# Patient Record
Sex: FEMALE | Race: WHITE | NOT HISPANIC OR LATINO | URBAN - METROPOLITAN AREA
[De-identification: names, ages, dates, MRNs, and addresses within clinical notes are randomized per-mention and may not be internally consistent; named-entity substitution may affect disease eponyms.]

---

## 2018-12-05 ENCOUNTER — OUTPATIENT (OUTPATIENT)
Dept: OUTPATIENT SERVICES | Facility: HOSPITAL | Age: 38
LOS: 1 days | Discharge: HOME | End: 2018-12-05

## 2018-12-06 DIAGNOSIS — E04.9 NONTOXIC GOITER, UNSPECIFIED: ICD-10-CM

## 2019-08-31 ENCOUNTER — EMERGENCY (EMERGENCY)
Facility: HOSPITAL | Age: 39
LOS: 0 days | Discharge: HOME | End: 2019-08-31
Attending: EMERGENCY MEDICINE | Admitting: EMERGENCY MEDICINE
Payer: COMMERCIAL

## 2019-08-31 VITALS
TEMPERATURE: 97 F | OXYGEN SATURATION: 99 % | HEART RATE: 74 BPM | WEIGHT: 179.9 LBS | RESPIRATION RATE: 18 BRPM | SYSTOLIC BLOOD PRESSURE: 114 MMHG | DIASTOLIC BLOOD PRESSURE: 70 MMHG | HEIGHT: 69 IN

## 2019-08-31 DIAGNOSIS — S90.01XA CONTUSION OF RIGHT ANKLE, INITIAL ENCOUNTER: ICD-10-CM
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
## 2019-08-31 PROCEDURE — 99283 EMERGENCY DEPT VISIT LOW MDM: CPT | Mod: 25

## 2019-08-31 PROCEDURE — 73590 X-RAY EXAM OF LOWER LEG: CPT | Mod: 26,RT

## 2019-08-31 PROCEDURE — 29515 APPLICATION SHORT LEG SPLINT: CPT

## 2019-08-31 PROCEDURE — 73610 X-RAY EXAM OF ANKLE: CPT | Mod: 26,RT

## 2019-08-31 RX ORDER — IBUPROFEN 200 MG
800 TABLET ORAL ONCE
Refills: 0 | Status: COMPLETED | OUTPATIENT
Start: 2019-08-31 | End: 2019-08-31

## 2019-08-31 RX ORDER — LEVOTHYROXINE SODIUM 125 MCG
1 TABLET ORAL
Qty: 0 | Refills: 0 | DISCHARGE

## 2019-08-31 RX ADMIN — Medication 800 MILLIGRAM(S): at 17:55

## 2019-08-31 NOTE — ED PROVIDER NOTE - PATIENT PORTAL LINK FT
You can access the FollowMyHealth Patient Portal offered by [facility] by registering at the following website: [email or web address]. By joining [employer]’s FollowMyHealth portal, you will also be able to view your health information using other applications (apps) compatible with our system.

## 2019-08-31 NOTE — ED PROVIDER NOTE - PHYSICAL EXAMINATION
msk: right ankle +tenderness to lateral aspect. no laxity or achilles tenderness. no 5th metatarsal tenderness. +tender contusion to right lateral lower leg    skin: hematoma to right lateral ankle

## 2019-08-31 NOTE — ED PROVIDER NOTE - OBJECTIVE STATEMENT
38 y/o female presents s/p twisting injury to right ankle [place] 4 pm [place] the park. patient unable to ambulate secondary to pain. patient with bruising to right lateral ankle. patient c/o throbbing pain. no other injuries. symptoms worse with movement and weight bearing

## 2019-08-31 NOTE — ED ADULT NURSE NOTE - NSIMPLEMENTINTERV_GEN_ALL_ED
Implemented All Universal Safety Interventions:  [place] to call system. Call bell, personal items and telephone within reach. Instruct patient to call for assistance. Room bathroom lighting operational. Non-slip footwear when patient is off stretcher. Physically safe environment: no spills, clutter or unnecessary equipment. Stretcher in lowest position, wheels locked, appropriate side rails in place.

## 2019-08-31 NOTE — ED PROVIDER NOTE - PROGRESS NOTE DETAILS
38 yo F presents c/o of right ankle pain. Pt states she twisted her ankle while walking at the park. Pt with painful ambulation. On exam + mild swelling and ecchymosis to the right lateral ankle, FROM with pain, achilles intact, no laxity, pulses intact. Plan - XR, splint, f/u with ortho.

## 2019-08-31 NOTE — ED PROVIDER NOTE - CARE PROVIDER_API CALL
Kingston Luo (MD)  Orthopaedic Surgery  [address] [place] [address]  Phone: [phone]  Fax: [phone]  Follow Up Time: 4-6 Days

## 2020-03-13 ENCOUNTER — EMERGENCY (EMERGENCY)
Facility: HOSPITAL | Age: 40
LOS: 0 days | Discharge: HOME | End: 2020-03-13
Attending: STUDENT IN AN ORGANIZED HEALTH CARE EDUCATION/TRAINING PROGRAM | Admitting: STUDENT IN AN ORGANIZED HEALTH CARE EDUCATION/TRAINING PROGRAM
Payer: COMMERCIAL

## 2020-03-13 VITALS
OXYGEN SATURATION: 100 % | RESPIRATION RATE: 20 BRPM | TEMPERATURE: 98 F | HEART RATE: 98 BPM | SYSTOLIC BLOOD PRESSURE: 145 MMHG | DIASTOLIC BLOOD PRESSURE: 80 MMHG

## 2020-03-13 DIAGNOSIS — R07.9 CHEST PAIN, UNSPECIFIED: ICD-10-CM

## 2020-03-13 DIAGNOSIS — R10.13 EPIGASTRIC PAIN: ICD-10-CM

## 2020-03-13 DIAGNOSIS — M54.9 DORSALGIA, UNSPECIFIED: ICD-10-CM

## 2020-03-13 PROBLEM — Z78.9 OTHER SPECIFIED HEALTH STATUS: Chronic | Status: ACTIVE | Noted: 2019-08-31

## 2020-03-13 LAB
ALBUMIN SERPL ELPH-MCNC: 5 G/DL — SIGNIFICANT CHANGE UP (ref 3.5–5.2)
ALP SERPL-CCNC: 48 U/L — SIGNIFICANT CHANGE UP (ref 30–115)
ALT FLD-CCNC: 19 U/L — SIGNIFICANT CHANGE UP (ref 0–41)
ANION GAP SERPL CALC-SCNC: 11 MMOL/L — SIGNIFICANT CHANGE UP (ref 7–14)
AST SERPL-CCNC: 21 U/L — SIGNIFICANT CHANGE UP (ref 0–41)
BILIRUB SERPL-MCNC: 0.5 MG/DL — SIGNIFICANT CHANGE UP (ref 0.2–1.2)
BUN SERPL-MCNC: 10 MG/DL — SIGNIFICANT CHANGE UP (ref 10–20)
CALCIUM SERPL-MCNC: 9.5 MG/DL — SIGNIFICANT CHANGE UP (ref 8.5–10.1)
CHLORIDE SERPL-SCNC: 105 MMOL/L — SIGNIFICANT CHANGE UP (ref 98–110)
CO2 SERPL-SCNC: 26 MMOL/L — SIGNIFICANT CHANGE UP (ref 17–32)
CREAT SERPL-MCNC: 0.7 MG/DL — SIGNIFICANT CHANGE UP (ref 0.7–1.5)
GLUCOSE SERPL-MCNC: 89 MG/DL — SIGNIFICANT CHANGE UP (ref 70–99)
HCT VFR BLD CALC: 34.1 % — LOW (ref 37–47)
HGB BLD-MCNC: 10.5 G/DL — LOW (ref 12–16)
LIDOCAIN IGE QN: 23 U/L — SIGNIFICANT CHANGE UP (ref 7–60)
MCHC RBC-ENTMCNC: 18 PG — LOW (ref 27–31)
MCHC RBC-ENTMCNC: 30.8 G/DL — LOW (ref 32–37)
MCV RBC AUTO: 58.4 FL — LOW (ref 81–99)
NRBC # BLD: 0 /100 WBCS — SIGNIFICANT CHANGE UP (ref 0–0)
PLATELET # BLD AUTO: 251 K/UL — SIGNIFICANT CHANGE UP (ref 130–400)
POTASSIUM SERPL-MCNC: 4.4 MMOL/L — SIGNIFICANT CHANGE UP (ref 3.5–5)
POTASSIUM SERPL-SCNC: 4.4 MMOL/L — SIGNIFICANT CHANGE UP (ref 3.5–5)
PROT SERPL-MCNC: 8.2 G/DL — HIGH (ref 6–8)
RBC # BLD: 5.84 M/UL — HIGH (ref 4.2–5.4)
RBC # FLD: 20.3 % — HIGH (ref 11.5–14.5)
SODIUM SERPL-SCNC: 142 MMOL/L — SIGNIFICANT CHANGE UP (ref 135–146)
TROPONIN T SERPL-MCNC: <0.01 NG/ML — SIGNIFICANT CHANGE UP
WBC # BLD: 7.59 K/UL — SIGNIFICANT CHANGE UP (ref 4.8–10.8)
WBC # FLD AUTO: 7.59 K/UL — SIGNIFICANT CHANGE UP (ref 4.8–10.8)

## 2020-03-13 PROCEDURE — 99285 EMERGENCY DEPT VISIT HI MDM: CPT

## 2020-03-13 PROCEDURE — 71046 X-RAY EXAM CHEST 2 VIEWS: CPT | Mod: 26

## 2020-03-13 PROCEDURE — 93010 ELECTROCARDIOGRAM REPORT: CPT

## 2020-03-13 RX ORDER — KETOROLAC TROMETHAMINE 30 MG/ML
15 SYRINGE (ML) INJECTION ONCE
Refills: 0 | Status: DISCONTINUED | OUTPATIENT
Start: 2020-03-13 | End: 2020-03-13

## 2020-03-13 RX ADMIN — Medication 15 MILLIGRAM(S): at 11:21

## 2020-03-13 NOTE — ED PROVIDER NOTE - NS ED ROS FT
Constitutional: See HPI.  Eyes: No visual changes, eye pain or discharge.  ENMT: No hearing changes, pain, discharge or infections. No neck pain or stiffness.  Cardiac: +chest pain; no SOB or edema. No chest pain with exertion.  Respiratory: + cough; no respiratory distress.   GI: No nausea, vomiting, diarrhea or abdominal pain.  : No dysuria, frequency or burning.  MS: No myalgia, muscle weakness, joint pain or back pain.  Neuro: No headache or weakness. No LOC.  Skin: No skin rash.  Endo: No hx of DM, thyroid disease  Except as documented in HPI, all other review of systems is negative

## 2020-03-13 NOTE — ED PROVIDER NOTE - CLINICAL SUMMARY MEDICAL DECISION MAKING FREE TEXT BOX
Pt w/ abd pain. trop neg. NL LFT, lipase. ekg and imaging reviewed. no acute ed events. Discussed all available results.  Pt understand results, plan of care, need for cont outpt work up as discussed, and signs and symptoms for ED return.  pt is comfortable with discharge.

## 2020-03-13 NOTE — ED PROVIDER NOTE - PATIENT PORTAL LINK FT
You can access the FollowMyHealth Patient Portal offered by [facility] by registering at the following website: [email or web address]. By joining [employer]’s FollowMyHealth portal, you will also be able to view your health information using other applications (apps) compatible with our system.

## 2020-03-13 NOTE — ED PROVIDER NOTE - NSFOLLOWUPINSTRUCTIONS_ED_ALL_ED_FT
FOLLOW UP WITH YOUR DOCTOR IN 3-5 DAYS.  FOR NEW OR WORSENING SYMPTOMS, RETURN TO THE ER.    Abdominal Pain    Many things can cause abdominal pain. Usually, abdominal pain is not caused by a disease and will improve without treatment. Your health care provider will do a physical exam and possibly order blood tests and imaging to help determine the seriousness of your pain. However, in many cases, no cause may be found and you may need further testing as an outpatient. Monitor your abdominal pain for any changes.     SEEK IMMEDIATE MEDICAL CARE IF YOU HAVE THE FOLLOWING SYMPTOMS: worsening abdominal pain, vomiting, diarrhea, inability to have bowel movements or pass gas, black or bloody stool, fever accompanying chest pain or back pain, or dizziness/lightheadedness.    Back Pain    Back pain is very common in adults. The cause of back pain is rarely dangerous and the pain often gets better over time. The cause of your back pain may not be known and may include strain of muscles or ligaments, degeneration of the spinal disks, or arthritis. Occasionally the pain may radiate down your leg(s). Over-the-counter medicines to reduce pain and inflammation are often the most helpful. Stretching and remaining active frequently helps the healing process.     SEEK IMMEDIATE MEDICAL CARE IF YOU HAVE THE FOLLOWING SYMPTOMS: bowel or bladder control problems, unusual weakness or numbness in your arms or legs, nausea or vomiting, abdominal pain, fever, dizziness/lightheadedness.

## 2020-03-13 NOTE — ED PROVIDER NOTE - ATTENDING CONTRIBUTION TO CARE
40 y/o F on sig pmh p/w intermittent, sharp epigastric discomfort x 2mo. + rad to back. no also has cough and intermittent sob x2wk. seen by pMD > 1 mo ago, told it was MSK. Pain better in some positions especially prone, worse w/ movement. No relationship to food. no ponce cp. no sig fhx, no smoking. Pt has no pain now. ros pe above. IMP: abd pain; hepatobil vs pancreatitis. low risk acs. P: labs, ekg, cxr, analgesia, reassess.

## 2020-03-13 NOTE — ED PROVIDER NOTE - PHYSICAL EXAMINATION
CONSTITUTIONAL: Well-developed; well-nourished; in no acute distress.   SKIN: warm, dry; no rash  HEAD: Normocephalic; atraumatic.  EYES: no conj injection  ENT: No nasal discharge; airway clear.  NECK: Supple; non tender.  CARD: S1, S2 normal; no murmurs, gallops, or rubs. Regular rate and rhythm.   RESP: No wheezes, rales or rhonchi.  ABD: soft ntnd  EXT: Normal ROM.  No clubbing, cyanosis or edema.   NEURO: Alert, oriented, grossly unremarkable  PSYCH: Cooperative, appropriate.

## 2020-03-13 NOTE — ED ADULT NURSE NOTE - OBJECTIVE STATEMENT
Patient c/o right substernal chest pain radiating to mid back and tingling sensation in throat for about a month, worsening cough and occasional SOB for about a week. Denies any other symptoms, denies any recent travel out of the country, denies any sick contact.

## 2021-08-26 ENCOUNTER — EMERGENCY (EMERGENCY)
Facility: HOSPITAL | Age: 41
LOS: 0 days | Discharge: HOME | End: 2021-08-26
Attending: EMERGENCY MEDICINE | Admitting: EMERGENCY MEDICINE
Payer: COMMERCIAL

## 2021-08-26 VITALS
RESPIRATION RATE: 18 BRPM | TEMPERATURE: 101 F | WEIGHT: 160.06 LBS | OXYGEN SATURATION: 99 % | SYSTOLIC BLOOD PRESSURE: 135 MMHG | HEART RATE: 109 BPM | HEIGHT: 69 IN | DIASTOLIC BLOOD PRESSURE: 95 MMHG

## 2021-08-26 DIAGNOSIS — Z90.49 ACQUIRED ABSENCE OF OTHER SPECIFIED PARTS OF DIGESTIVE TRACT: ICD-10-CM

## 2021-08-26 DIAGNOSIS — Z86.39 PERSONAL HISTORY OF OTHER ENDOCRINE, NUTRITIONAL AND METABOLIC DISEASE: ICD-10-CM

## 2021-08-26 DIAGNOSIS — R05 COUGH: ICD-10-CM

## 2021-08-26 DIAGNOSIS — U07.1 COVID-19: ICD-10-CM

## 2021-08-26 DIAGNOSIS — Z79.890 HORMONE REPLACEMENT THERAPY: ICD-10-CM

## 2021-08-26 DIAGNOSIS — R50.9 FEVER, UNSPECIFIED: ICD-10-CM

## 2021-08-26 DIAGNOSIS — R07.89 OTHER CHEST PAIN: ICD-10-CM

## 2021-08-26 LAB — SARS-COV-2 RNA SPEC QL NAA+PROBE: DETECTED

## 2021-08-26 PROCEDURE — 99284 EMERGENCY DEPT VISIT MOD MDM: CPT

## 2021-08-26 PROCEDURE — 71045 X-RAY EXAM CHEST 1 VIEW: CPT | Mod: 26

## 2021-08-26 NOTE — ED PROVIDER NOTE - PATIENT PORTAL LINK FT
You can access the FollowMyHealth Patient Portal offered by [facility] by registering at the following website: [email or web address]. By joining [employer]’s FollowMyHealth portal, you will also be able to view your health information using other applications (apps) compatible with our system.

## 2021-08-26 NOTE — ED PROVIDER NOTE - ATTENDING CONTRIBUTION TO CARE
40 y/o female h/o thyroid disease, appendectomy, not COVID vaccinated p/w fever (Tm 100.4 oral) x yesterday, myalgias, cough, chest discomfort, generalized weakness, denies hemoptysis, orthopnea, PND, LE pain or swelling, recent immobilization or travel or other associated complaints at present.    Old chart reviewed.  I have reviewed and agree with the initial nursing note, except as documented in my note.    VSS, awake, alert, non-toxic appearing, sitting comfortably, in NAD, oropharynx clear, no skin rash or lesions, no tracheal deviation, non-labored breathing without accessory muscle use apparent or pursed lip breathing, no retractions, speaks full sentences, chest CTAB, no w/r/r, +S1/S2, RRR, no m/r/g, abdomen soft, NT, ND, +BS, AO x 3, clear speech and steady gait.    VSS, sat>90% RA with exertion, well appearing, clinical picture possibly c/w coronavirus, unlikely underlying cardiac or vascular complications, results d/w pt, states feels well enough to go home, no CHANDLER, ambulating in ED w/o difficulty, advised they do not require hospitalization at this time although if symptoms change or worsen, may need to be hospitalized at a later date, also advised to self isolate.    The patient was given detailed return precautions and advised to return to the emergency department if any new symptoms developed, symptoms worsened or for any concerns. The patient was offered the opportunity to ask questions and verbalized that they understand the diagnosis and discharge instructions. 42 y/o female h/o thyroid disease, appendectomy, not COVID vaccinated p/w fever (Tm 100.4 oral) x yesterday, myalgias, cough, chest discomfort, generalized weakness, denies hemoptysis, orthopnea, PND, LE pain or swelling, recent immobilization or travel or other associated complaints at present.    Old chart reviewed.  I have reviewed and agree with the initial nursing note, except as documented in my note.    VSS, HR appropriate for temp, awake, alert, non-toxic appearing, sitting comfortably, in NAD, oropharynx clear, no skin rash or lesions, no tracheal deviation, non-labored breathing without accessory muscle use apparent or pursed lip breathing, no retractions, speaks full sentences, chest CTAB, no w/r/r, +S1/S2, RRR, no m/r/g, abdomen soft, NT, ND, +BS, AO x 3, clear speech and steady gait.    VSS, sat>90% RA with exertion, well appearing, clinical picture possibly c/w coronavirus, unlikely underlying cardiac or vascular complications, results d/w pt, states feels well enough to go home, no CHANDLER, ambulating in ED w/o difficulty, advised they do not require hospitalization at this time although if symptoms change or worsen, may need to be hospitalized at a later date, also advised to self isolate.    The patient was given detailed return precautions and advised to return to the emergency department if any new symptoms developed, symptoms worsened or for any concerns. The patient was offered the opportunity to ask questions and verbalized that they understand the diagnosis and discharge instructions.

## 2021-08-26 NOTE — ED PROVIDER NOTE - CARDIAC, MLM
Normal rate, regular rhythm.  Heart sounds S1, S2.  No murmurs, rubs or gallops.  No lower extremity edema/tenderness.

## 2024-06-03 NOTE — ED PROVIDER NOTE - EKG #1 DATE/TIME
13-Mar-2020 10:30
pt ambulated into triage from wound care center, was sent to ED for chronic wound on right hallux and IV antibiotic treatment.

## 2024-07-14 NOTE — ED PROVIDER NOTE - OBJECTIVE STATEMENT
Thank you for allowing us to care for Ankit today!   41 y.o. female without any PMH presented to the ER c/o fever (Tmax 100.4), chills, fatigue, chest tightness, myalgias and cough since yesterday.  Pt did not receive her Covid vaccine.  Denies dizziness, nausea, vomiting, diarrhea, SOB, sore throat.  No one sick at home.  No other complaints.

## 2024-10-03 NOTE — ED PROVIDER NOTE - CPE EDP HEME LYMPH NORM
Issues in recent weeks/months of excessive daytime urination, at least hourly.  No change in fluid intake to prompt this.  Some nocturia as well.  Concerns were for possible pituitary issue leading to these symptoms.  Has not had workup recently to check urine for evidence of being overly dilute or concentrated.     Diabetes insipidus would be very rare in absence of known pituitary lesion.  Thirst has remained intact.      Plan:  - Check serum Osmolality  - Check urine osmolality  - Check UA    If concerns for overly dilute urine with concentrated serum studies then further testing may be needing in the clinic.  For now drinking to thirst should be appropriate while awaiting labs   normal...